# Patient Record
Sex: FEMALE | Race: AMERICAN INDIAN OR ALASKA NATIVE
[De-identification: names, ages, dates, MRNs, and addresses within clinical notes are randomized per-mention and may not be internally consistent; named-entity substitution may affect disease eponyms.]

---

## 2018-03-21 ENCOUNTER — HOSPITAL ENCOUNTER (OUTPATIENT)
Dept: HOSPITAL 5 - OR | Age: 58
Discharge: HOME | End: 2018-03-21
Attending: SURGERY
Payer: MEDICARE

## 2018-03-21 VITALS — DIASTOLIC BLOOD PRESSURE: 84 MMHG | SYSTOLIC BLOOD PRESSURE: 128 MMHG

## 2018-03-21 DIAGNOSIS — F41.9: ICD-10-CM

## 2018-03-21 DIAGNOSIS — Z88.8: ICD-10-CM

## 2018-03-21 DIAGNOSIS — M10.9: ICD-10-CM

## 2018-03-21 DIAGNOSIS — E66.9: ICD-10-CM

## 2018-03-21 DIAGNOSIS — G47.09: ICD-10-CM

## 2018-03-21 DIAGNOSIS — G62.9: ICD-10-CM

## 2018-03-21 DIAGNOSIS — Z88.0: ICD-10-CM

## 2018-03-21 DIAGNOSIS — I10: ICD-10-CM

## 2018-03-21 DIAGNOSIS — M31.6: Primary | ICD-10-CM

## 2018-03-21 PROCEDURE — 88313 SPECIAL STAINS GROUP 2: CPT

## 2018-03-21 PROCEDURE — 88305 TISSUE EXAM BY PATHOLOGIST: CPT

## 2018-03-21 NOTE — SHORT STAY SUMMARY
Short Stay Documentation


Date of service: 03/21/18


Narrative H&P: 





Admitted to the minor surgical operative suite for outpatient bilateral 

temporal artery biopsy





- History


H&P: obtained from office





- Allergies and Medications


Current Medications: 


 Allergies





amlodipine [From Norvasc] Allergy (Verified 03/20/18 12:26)


 Nausea, dizziness


gabapentin Allergy (Verified 03/20/18 12:26)


 Rash


Penicillins Allergy (Verified 03/20/18 12:26)


 Anaphylaxis


pregabalin [From Lyrica] Allergy (Verified 03/20/18 12:26)


 Swelling , rash


lisinopril Adverse Reaction (Verified 03/20/18 12:26)


 cough





 Home Medications











 Medication  Instructions  Recorded  Confirmed  Last Taken  Type


 


Cetirizine HCl [Allergy Relief] 10 mg PO DAILY 03/20/18 03/20/18 Unknown History


 


Fluticasone [Flonase] 1 spray IH BID 03/20/18 03/20/18 Unknown History


 


Hydrochlorothiazide [HCTZ] 25 mg PO DAILY 03/20/18 03/20/18 Unknown History


 


LORazepam [Ativan] 1 mg PO PRN PRN 03/20/18 03/20/18 Unknown History


 


Zolpidem Tartrate 5 mg PO HS 03/20/18 03/20/18 Unknown History


 


oxyCODONE /ACETAMINOPHEN [Percocet 1 tab PO Q4HR PRN 03/20/18 03/20/18 Unknown 

History





5/325]     














- Brief post op/procedure progress note


Date of procedure: 03/21/18


Pre-op diagnosis: temporal arteritis


Post-op diagnosis: same


Procedure: 





Pre-operative diagnosis: temporal arteritis





Post-operative diagnosis: Same





Procedure name(s): Bilateral temporal artery biopsy


Surgeon: Ottoniel Whiting MD





Anesthesia: Local





BL: Minimal


Specmen(s): Bilateral temporal arteries


Complications: None


Finding: Arteries grossly normal





Prcedure:


 Patient in supine position with the head rotated to the right the right 

preauricular area was then prepped and draped using standard sterile technique.

  Through anesthetized skin over vertical incision was made extending just 

above the ear to the penicle.  Incision was then sharply deepened until the 

fascia was encountered below that level the artery was not immediately 

identified. After a few minutes, the doppler probe was used to identify the 

artery's location and it was subsequently visualized and then mobilized and 

ligated proximally and distally.  A 1-1/2 cm segment was then removed and 

placed in specimen cup for permanent sections.  The incision was then blocked 

using 0.5% Marcaine and then closed in layers using interrupted 3-0 Vicryl for 

the subcutaneous and 4-0 Monocryl subcuticular.  The skin was sealed with 

dermabond.  After the skin seal bonded, the left preauricular area was prepped 

and draped using identical technique.  A symmetrical incision was then placed 

in the preauricular location and an identical procedure was then followed thus 

mobilizing ligating and excising a 1-1/2 cm segment of the superficial temporal 

artery.  Hemostasis was excellent and the incision was then blocked with 

Marcaine and closed again using 3-0 Vicryl interrupted subcutaneous with 4-0 

Monocryl subcuticular suture.  The skin was sealed with dermabond.  Patient 

then was returned to the supine position and discharged home in stable 

condition having tolerated procedure well.  Sponge and needle counts correct.  

Specimens were sent to the lab for permanent sectioning.


Anesthesia: local


Findings: 





Temporal arteries grossly normal.  Sent to pathology for evaluation


Surgeon: OTTONIEL WHITING


Estimated blood loss: minimal


Pathology: list (bilateral temporal arteries)


Specimen disposition: to lab


Condition: stable





- Hospital course


Hospital course: 





Benign





- Disposition


Condition at discharge: Stable





- Discharge Diagnoses


(1) Temporal arteritis


Status: Acute   





Short Stay Discharge Plan


Activity: advance as tolerated


Diet: regular


Wound: keep clean and dry


Special Instructions: no heavy lifting


Follow up with: 


ANASTACIA ARVIZU JR., MD [Primary Care Provider] - 7 Days


Forms:  [facility]